# Patient Record
Sex: MALE | Race: WHITE | NOT HISPANIC OR LATINO | Employment: FULL TIME | URBAN - METROPOLITAN AREA
[De-identification: names, ages, dates, MRNs, and addresses within clinical notes are randomized per-mention and may not be internally consistent; named-entity substitution may affect disease eponyms.]

---

## 2018-05-14 ENCOUNTER — OFFICE VISIT (OUTPATIENT)
Dept: URGENT CARE | Facility: CLINIC | Age: 23
End: 2018-05-14
Payer: COMMERCIAL

## 2018-05-14 VITALS
HEART RATE: 97 BPM | DIASTOLIC BLOOD PRESSURE: 71 MMHG | RESPIRATION RATE: 16 BRPM | SYSTOLIC BLOOD PRESSURE: 133 MMHG | HEIGHT: 72 IN | TEMPERATURE: 97.8 F | WEIGHT: 250 LBS | OXYGEN SATURATION: 100 % | BODY MASS INDEX: 33.86 KG/M2

## 2018-05-14 DIAGNOSIS — H60.501 ACUTE OTITIS EXTERNA OF RIGHT EAR, UNSPECIFIED TYPE: Primary | ICD-10-CM

## 2018-05-14 PROCEDURE — 99203 OFFICE O/P NEW LOW 30 MIN: CPT | Performed by: PHYSICIAN ASSISTANT

## 2018-05-14 RX ORDER — LAMOTRIGINE 150 MG/1
100 TABLET ORAL DAILY
COMMUNITY

## 2018-05-14 RX ORDER — FLUVOXAMINE MALEATE 150 MG/1
CAPSULE, EXTENDED RELEASE ORAL
COMMUNITY

## 2018-05-14 NOTE — PROGRESS NOTES
[facility] Now        NAME: Shi Fink is a 25 y o  male  : 1995    MRN: [id number]  DATE: May 14, 2018  TIME: 6:52 PM    Assessment and Plan   Acute otitis externa of right ear, unspecified type [H60 501]  1  Acute otitis externa of right ear, unspecified type  neomycin-polymyxin-hydrocortisone (CORTISPORIN) otic solution     Patient Instructions     Take antibiotic drops as prescribed  Patient counseled extensively to avoid OTC drops and mechanical cleaning of ear until infection clears  May use debrox drops for ear wax removal and return for ear flushing 10 days from now  Follow up with PCP in 3-5 days  Proceed to  ER if symptoms worsen  Chief Complaint     Chief Complaint   Patient presents with    Cerumen Impaction     stated his right ear is blocked  tried using a q-tip and pushed wax in further     History of Present Illness     21yo M p/w blocked painful right ear x 10 days  Patient has been obsessively cleansing ear with qtip and patient believes he accidentally impacted his cerumen  Patient requesting ear be flushed at this time  Patient denies fever, chills, n/v/d, sob/wheezing, sore throat  Review of Systems   Review of Systems   Constitutional: Negative for activity change, appetite change, chills, diaphoresis, fatigue, fever and unexpected weight change  HENT: Positive for ear discharge and ear pain  Negative for rhinorrhea  Respiratory: Negative for apnea, cough, choking, chest tightness, shortness of breath, wheezing and stridor            Current Medications       Current Outpatient Prescriptions:     ARIPiprazole (ABILIFY PO), Take 7 5 mg by mouth daily, Disp: , Rfl:     Fluvoxamine Maleate (LUVOX CR) 150 MG CP24, Take by mouth, Disp: , Rfl:     lamoTRIgine (LaMICtal) 150 MG tablet, Take 100 mg by mouth daily, Disp: , Rfl:     neomycin-polymyxin-hydrocortisone (CORTISPORIN) otic solution, Administer 4 drops to the right ear every 6 (six) hours for 10 days, Disp: 10 mL, Rfl: 0    Current Allergies     Allergies as of 05/14/2018 - Reviewed 05/14/2018   Allergen Reaction Noted    Ativan [lorazepam] Delerium 05/14/2018            The following portions of the patient's history were reviewed and updated as appropriate: allergies, current medications, past family history, past medical history, past social history, past surgical history and problem list      Past Medical History:   Diagnosis Date    ADHD     Bipolar 1 disorder ([address] )     OCD (obsessive compulsive disorder)        History reviewed  No pertinent surgical history  No family history on file  Medications have been verified  Objective   /71   Pulse 97   Temp 97 8 °F (36 6 °C) (Tympanic)   Resp 16   Ht 6' (1 829 m)   Wt 113 kg (250 lb)   SpO2 100%   BMI 33 91 kg/m²        Physical Exam     Physical Exam   Constitutional: He appears well-developed and well-nourished  HENT:   Head: Normocephalic and atraumatic  Right Ear: There is drainage (purulent) and swelling (and erythema of canal)  Tympanic membrane is not erythematous and not bulging  Left Ear: Hearing, tympanic membrane, external ear and ear canal normal    Nose: Nose normal    Mouth/Throat: Oropharynx is clear and moist  No oropharyngeal exudate  Cardiovascular: Normal rate, regular rhythm, normal heart sounds and intact distal pulses  Exam reveals no gallop and no friction rub  No murmur heard  Pulmonary/Chest: Effort normal and breath sounds normal  No respiratory distress  He has no wheezes  Abdominal: Soft  Bowel sounds are normal  He exhibits no distension  There is no tenderness  Lymphadenopathy:     He has cervical adenopathy  Right cervical: Superficial cervical adenopathy present  Left cervical: No superficial cervical adenopathy present

## 2020-12-01 ENCOUNTER — OFFICE VISIT (OUTPATIENT)
Dept: URGENT CARE | Facility: CLINIC | Age: 25
End: 2020-12-01
Payer: COMMERCIAL

## 2020-12-01 VITALS
HEART RATE: 87 BPM | RESPIRATION RATE: 16 BRPM | BODY MASS INDEX: 37.25 KG/M2 | OXYGEN SATURATION: 98 % | TEMPERATURE: 98.4 F | HEIGHT: 72 IN | WEIGHT: 275 LBS

## 2020-12-01 DIAGNOSIS — J06.9 VIRAL URI: Primary | ICD-10-CM

## 2020-12-01 PROCEDURE — G0382 LEV 3 HOSP TYPE B ED VISIT: HCPCS | Performed by: PHYSICIAN ASSISTANT

## 2020-12-01 PROCEDURE — U0003 INFECTIOUS AGENT DETECTION BY NUCLEIC ACID (DNA OR RNA); SEVERE ACUTE RESPIRATORY SYNDROME CORONAVIRUS 2 (SARS-COV-2) (CORONAVIRUS DISEASE [COVID-19]), AMPLIFIED PROBE TECHNIQUE, MAKING USE OF HIGH THROUGHPUT TECHNOLOGIES AS DESCRIBED BY CMS-2020-01-R: HCPCS | Performed by: PHYSICIAN ASSISTANT

## 2020-12-01 RX ORDER — LEVOTHYROXINE SODIUM 0.03 MG/1
25 TABLET ORAL DAILY
COMMUNITY
Start: 2020-11-01

## 2020-12-02 LAB — SARS-COV-2 RNA SPEC QL NAA+PROBE: NOT DETECTED

## 2020-12-03 ENCOUNTER — TELEPHONE (OUTPATIENT)
Dept: URGENT CARE | Facility: CLINIC | Age: 25
End: 2020-12-03

## 2020-12-20 ENCOUNTER — OFFICE VISIT (OUTPATIENT)
Dept: URGENT CARE | Facility: CLINIC | Age: 25
End: 2020-12-20
Payer: COMMERCIAL

## 2020-12-20 VITALS
WEIGHT: 275 LBS | RESPIRATION RATE: 16 BRPM | TEMPERATURE: 97.5 F | BODY MASS INDEX: 37.25 KG/M2 | HEIGHT: 72 IN | HEART RATE: 104 BPM | OXYGEN SATURATION: 98 %

## 2020-12-20 DIAGNOSIS — J06.9 VIRAL URI WITH COUGH: Primary | ICD-10-CM

## 2020-12-20 PROCEDURE — G0382 LEV 3 HOSP TYPE B ED VISIT: HCPCS | Performed by: PHYSICIAN ASSISTANT

## 2020-12-20 PROCEDURE — 99283 EMERGENCY DEPT VISIT LOW MDM: CPT | Performed by: PHYSICIAN ASSISTANT

## 2020-12-20 PROCEDURE — U0003 INFECTIOUS AGENT DETECTION BY NUCLEIC ACID (DNA OR RNA); SEVERE ACUTE RESPIRATORY SYNDROME CORONAVIRUS 2 (SARS-COV-2) (CORONAVIRUS DISEASE [COVID-19]), AMPLIFIED PROBE TECHNIQUE, MAKING USE OF HIGH THROUGHPUT TECHNOLOGIES AS DESCRIBED BY CMS-2020-01-R: HCPCS | Performed by: PHYSICIAN ASSISTANT

## 2020-12-22 LAB — SARS-COV-2 RNA SPEC QL NAA+PROBE: NOT DETECTED

## 2022-11-22 ENCOUNTER — HOSPITAL ENCOUNTER (EMERGENCY)
Facility: HOSPITAL | Age: 27
Discharge: HOME | End: 2022-11-22
Attending: EMERGENCY MEDICINE

## 2022-11-22 VITALS
DIASTOLIC BLOOD PRESSURE: 79 MMHG | WEIGHT: 254.19 LBS | RESPIRATION RATE: 18 BRPM | TEMPERATURE: 96.8 F | SYSTOLIC BLOOD PRESSURE: 124 MMHG | OXYGEN SATURATION: 98 % | HEART RATE: 64 BPM | BODY MASS INDEX: 34.47 KG/M2

## 2022-11-22 DIAGNOSIS — R55 SYNCOPE: ICD-10-CM

## 2022-11-22 DIAGNOSIS — S61.019A: Primary | ICD-10-CM

## 2022-11-22 LAB
ATRIAL RATE: 65 BPM
GLUCOSE SERPL-MCNC: 161 MG/DL (ref 65–140)
P AXIS: 5 DEGREES
PR INTERVAL: 132 MS
QRS AXIS: 3 DEGREES
QRSD INTERVAL: 108 MS
QT INTERVAL: 392 MS
QTC INTERVAL: 407 MS
T WAVE AXIS: 12 DEGREES
VENTRICULAR RATE: 65 BPM

## 2022-11-22 RX ORDER — ACETAMINOPHEN 325 MG/1
650 TABLET ORAL ONCE
Status: COMPLETED | OUTPATIENT
Start: 2022-11-22 | End: 2022-11-22

## 2022-11-22 RX ORDER — BACITRACIN, NEOMYCIN, POLYMYXIN B 400; 3.5; 5 [USP'U]/G; MG/G; [USP'U]/G
1 OINTMENT TOPICAL ONCE
Status: DISCONTINUED | OUTPATIENT
Start: 2022-11-22 | End: 2022-11-22 | Stop reason: HOSPADM

## 2022-11-22 RX ORDER — ONDANSETRON 4 MG/1
4 TABLET, ORALLY DISINTEGRATING ORAL ONCE
Status: DISCONTINUED | OUTPATIENT
Start: 2022-11-22 | End: 2022-11-22 | Stop reason: HOSPADM

## 2022-11-22 RX ORDER — LIDOCAINE HYDROCHLORIDE 10 MG/ML
5 INJECTION, SOLUTION EPIDURAL; INFILTRATION; INTRACAUDAL; PERINEURAL ONCE
Status: COMPLETED | OUTPATIENT
Start: 2022-11-22 | End: 2022-11-22

## 2022-11-22 RX ADMIN — ACETAMINOPHEN 650 MG: 325 TABLET ORAL at 12:23

## 2022-11-22 RX ADMIN — LIDOCAINE HYDROCHLORIDE 5 ML: 10 INJECTION, SOLUTION EPIDURAL; INFILTRATION; INTRACAUDAL; PERINEURAL at 12:47

## 2022-11-22 NOTE — Clinical Note
Jillian Santana was seen and treated in our emergency department on 11/22/2022  Diagnosis:     Chris Christian  may return to work on return date  He may return on this date: 11/23/2022         If you have any questions or concerns, please don't hesitate to call        Matt Barrios MD    ______________________________           _______________          _______________  Hospital Representative                              Date                                Time

## 2022-11-22 NOTE — DISCHARGE INSTRUCTIONS
Thank you for letting us take care of you  You have been evaluated for a thumb laceration and passing out afterwards  Please continue drinking plenty of fluids  Please return in 10-14 days to have sutures removed  At this time, you have no clinical evidence of symptoms or problems that will require hospitalization, however you should be evaluated soon by a primary care physician, and contact information has been provided  Follow up with your primary care physician  This is important as many medical conditions can be managed as an outpatient, in addition to routine health screening  Seeing your primary doctor often can help identify changes in the medical issue that brought you to the ED for care today  If you experience any new symptoms or acute worsening of current symptoms, please return to the ED

## 2022-11-22 NOTE — ED PROVIDER NOTES
History  Chief Complaint   Patient presents with   • Syncope     Was cleaning glass light fixture at work when it broke, laceration to right thumb, sat down in a chair and looked at laceration, passed out and fell out of chair onto face, no thinners     24-year-old male with past medical history pertinent for last tetanus immunization 2 years ago who presents to the emergency department by EMS for evaluation of a thumb laceration that occurred while at work prior to arrival and a syncopal episode occurred afterwards  Patient reportedly hit his head on the ground after cutting himself accidentally  Patient reports 1/10 pain at this time  States he was cleaning a glass light fixture at work when it broke and resulted in a laceration to his right thumb  States he has had down a chair and then look at the laceration then passed out  Reports that he fell onto his face  Denies any blood thinner use  States he has normal range of motion of his thumb  Reports mild numbness at the tip  States bleeding was controlled with pressure  Denies any foreign bodies  States he is right-handed  Reports abrasion over the right periorbital area and a small hematoma over the right forehead  Denies any neck pain or any other injuries  States he did eat today  Denies any diabetes history  No other concerns  Prior to Admission Medications   Prescriptions Last Dose Informant Patient Reported? Taking? Fluvoxamine Maleate 150 MG CP24   Yes Yes   Sig: Take by mouth   lamoTRIgine (LaMICtal) 150 MG tablet   Yes Yes   Sig: Take 100 mg by mouth daily   lisdexamfetamine (VYVANSE) 40 MG capsule   Yes Yes   Sig: Take by mouth      Facility-Administered Medications: None       Past Medical History:   Diagnosis Date   • ADHD    • Bipolar 1 disorder ([facility] [address] )    • OCD (obsessive compulsive disorder)        Past Surgical History:   Procedure Laterality Date   • TOOTH EXTRACTION         History reviewed   No pertinent family history  I have reviewed and agree with the history as documented  E-Cigarette/Vaping   • E-Cigarette Use Never User      E-Cigarette/Vaping Substances     Social History     Tobacco Use   • Smoking status: Never   • Smokeless tobacco: Never   Vaping Use   • Vaping Use: Never used   Substance Use Topics   • Alcohol use: Yes     Comment: rare   • Drug use: Never       Review of Systems   Constitutional: Negative for activity change, appetite change, chills, diaphoresis and fever  HENT: Negative for congestion, rhinorrhea and sore throat  Eyes: Negative for visual disturbance  Respiratory: Negative for chest tightness and shortness of breath  Cardiovascular: Negative for chest pain, palpitations and leg swelling  Gastrointestinal: Negative for abdominal pain, constipation, diarrhea, nausea and vomiting  Genitourinary: Negative for difficulty urinating and hematuria  Musculoskeletal: Negative for back pain and neck pain  Skin: Positive for wound  Negative for color change and rash  Neurological: Positive for syncope  Negative for dizziness, weakness and headaches  Psychiatric/Behavioral: Negative for behavioral problems  Physical Exam  Physical Exam  Vitals and nursing note reviewed  Constitutional:       General: He is not in acute distress  Appearance: He is well-developed and well-nourished  He is not diaphoretic  HENT:      Head: Normocephalic  Comments: 1 cm diameter abrasion over the right periorbital area just to the right of the right eye ; 3 cm diameter hematoma over the right forehead with no active bleeding  Minimal tenderness to palpation  No bony deformity; no tenderness over the face; mandibles stable  No dental pain on palpation       Right Ear: External ear normal       Left Ear: External ear normal       Nose: Nose normal       Mouth/Throat:      Mouth: Oropharynx is clear and moist    Eyes:      Extraocular Movements: EOM normal       Pupils: Pupils are equal, round, and reactive to light  Neck:      Comments: C-collar in place on arrival  Cardiovascular:      Rate and Rhythm: Normal rate and regular rhythm  Pulses: Normal pulses and intact distal pulses  Heart sounds: Normal heart sounds  Pulmonary:      Effort: Pulmonary effort is normal  No respiratory distress  Breath sounds: Normal breath sounds  No wheezing or rales  Abdominal:      General: Bowel sounds are normal       Palpations: Abdomen is soft  There is no mass  Tenderness: There is no abdominal tenderness  Musculoskeletal:         General: No tenderness, deformity or edema  Normal range of motion  Cervical back: Normal range of motion and neck supple  Comments: Normal flexion and extension of the right thumb with normal sensation throughout  Skin:     General: Skin is warm and dry  Capillary Refill: Capillary refill takes less than 2 seconds  Findings: No erythema or rash  Comments: Approximately 2 cm C-shaped laceration to the distal thumb on the right side  Minimal active bleeding  No tenderness to palpation  No foreign bodies noted  Neurological:      General: No focal deficit present  Mental Status: He is alert and oriented to person, place, and time  Mental status is at baseline  Motor: No abnormal muscle tone  Comments: Patient is awake and alert and oriented x 3  No apparent deficits of memory or concentration  Speech is fluent  Fund of knowledge is appropriate  CN II - No field cuts by confrontation  CN III, IV, VI - pupils are 3 mm and briskly reactive  EOMs are full with no nystagmus  CN V - facial sensation is intact  CN VII - no facial asymmetry  CN VIII - auditory acuity is grossly intact to finger rub bilaterally  CN IX - palate rises symmetrically  CN XI - SCM and trapezius muscles are intact  CN XII - tongue protrudes midline    Sensory exam in intact to light touch, pinprick in all dermatomes tested    Coordination is grossly intact for finger-to-nose  5/5 strength in all extremities   Psychiatric:         Behavior: Behavior normal          Vital Signs  ED Triage Vitals [11/22/22 1220]   Temperature Pulse Respirations Blood Pressure SpO2   (!) 96 8 °F (36 °C) 81 18 133/83 100 %      Temp src Heart Rate Source Patient Position - Orthostatic VS BP Location FiO2 (%)   -- Monitor Lying Left arm --      Pain Score       No Pain           Vitals:    11/22/22 1220 11/22/22 1230 11/22/22 1245   BP: 133/83 129/82 129/81   Pulse: 81 73 80   Patient Position - Orthostatic VS: Lying           Visual Acuity  Visual Acuity    Flowsheet Row Most Recent Value   L Pupil Size (mm) 3   R Pupil Size (mm) 3          ED Medications  Medications   neomycin-bacitracin-polymyxin b (NEOSPORIN) ointment 1 small application (has no administration in time range)   ondansetron (ZOFRAN-ODT) dispersible tablet 4 mg (has no administration in time range)   acetaminophen (TYLENOL) tablet 650 mg (650 mg Oral Given 11/22/22 1223)   lidocaine (PF) (XYLOCAINE-MPF) 1 % injection 5 mL (5 mL Intradermal Given by Other 11/22/22 1247)       Diagnostic Studies  Results Reviewed     Procedure Component Value Units Date/Time    Fingerstick Glucose (POCT) [[id number]]  (Abnormal) Collected: 11/22/22 1223    Lab Status: Final result Updated: 11/22/22 1224     POC Glucose 161 mg/dl                  No orders to display              Procedures  ECG 12 Lead Documentation Only    Date/Time: 11/22/2022 12:20 PM  Performed by: Carmen Byers MD  Authorized by: Carmen Byers MD     ECG reviewed by me, the ED Provider: yes    Patient location:  ED  Previous ECG:     Previous ECG:  Unavailable  Interpretation:     Interpretation: normal    Rate:     ECG rate:  65    ECG rate assessment: normal    Rhythm:     Rhythm: sinus rhythm    Ectopy:     Ectopy: none    QRS:     QRS axis:  Normal  Conduction:     Conduction: normal    ST segments:     ST segments: Normal  T waves:     T waves: normal    Laceration repair    Date/Time: 11/22/2022 12:21 PM  Performed by: Matt Barrios MD  Authorized by: Matt Barrios MD   Consent: Verbal consent obtained  Risks and benefits: risks, benefits and alternatives were discussed  Consent given by: patient  Patient understanding: patient states understanding of the procedure being performed  Patient identity confirmed: verbally with patient  Time out: Immediately prior to procedure a "time out" was called to verify the correct patient, procedure, equipment, support staff and site/side marked as required  Body area: upper extremity  Location details: right thumb  Laceration length: 3 cm  Foreign bodies: no foreign bodies  Tendon involvement: none  Nerve involvement: none  Vascular damage: no  Anesthesia: local infiltration and digital block    Anesthesia:  Local Anesthetic: lidocaine 1% without epinephrine  Anesthetic total: 5 mL    Sedation:  Patient sedated: no      Wound Dehiscence:  Superficial Wound Dehiscence: simple closure      Procedure Details:  Preparation: Patient was prepped and draped in the usual sterile fashion  Irrigation solution: saline  Skin closure: 4-0 Prolene  Number of sutures: 3  Technique: simple  Approximation: close  Approximation difficulty: simple  Dressing: band aid    Patient tolerance: patient tolerated the procedure well with no immediate complications               ED Course          RESULTS:  Results Reviewed     Procedure Component Value Units Date/Time    Fingerstick Glucose (POCT) [[id number]]  (Abnormal) Collected: 11/22/22 1223    Lab Status: Final result Updated: 11/22/22 1224     POC Glucose 161 mg/dl           No orders to display       Vitals:    11/22/22 1220 11/22/22 1230 11/22/22 1245   BP: 133/83 129/82 129/81   Pulse: 81 73 80   Resp: 18     Patient Position - Orthostatic VS: Lying     Temp: (!) 96 8 °F (36 °C)         MDM  Number of Diagnoses or Management Options  Syncope  Thumb laceration, unspecified laterality, initial encounter  Diagnosis management comments: 51-year-old male with past medical history pertinent for last tetanus immunization 2 years ago who presents to the emergency department by EMS for evaluation of a thumb laceration that occurred while at work prior to arrival and a syncopal episode occurred afterwards  Vital signs on arrival here non concerning  Patient has exam as above  Laceration was repaired as noted in the procedure section above  EKG obtained given patient's syncopal episode in showed no acute concerns for any arrhythmia or any signs of ischemia  Blood glucose level was within normal limits on arrival   Patient likely had a vasovagal episode after seeing blood  Tetanus immunization over the up-to-date  Patient advised to return in 10-14 days to have sutures removed  Given Tylenol and bacitracin here for additional supportive care  C-collar cleared  Understands outpatient management plan and return precautions         Amount and/or Complexity of Data Reviewed  Clinical lab tests: ordered and reviewed  Tests in the medicine section of CPT®: ordered and reviewed  Review and summarize past medical records: yes    Risk of Complications, Morbidity, and/or Mortality  Presenting problems: moderate  Diagnostic procedures: moderate  Management options: moderate        Disposition  Final diagnoses:   Thumb laceration, unspecified laterality, initial encounter   Syncope     Time reflects when diagnosis was documented in both MDM as applicable and the Disposition within this note     Time User Action Codes Description Comment    11/22/2022 12:17 PM Bert Carlisle N Add [S61 019A] Thumb laceration, unspecified laterality, initial encounter     11/22/2022 12:17 PM Reilly Martinez Add [R55] Syncope       ED Disposition     ED Disposition   Discharge    Condition   Stable    Date/Time   Tue Nov 22, 2022  1:00 PM    [facility] discharge to home/self care                Follow-up Information     Follow up With Specialties Details Why Contact Info Additional Information    [facility] Emergency Department Emergency Medicine In 10 days For suture removal Daniella Mckinley 4 [phone]  [address] [facility] Emergency Department, [address]  ChandriakBarbara Nuoralia [address] [place] [address], [phone]          Patient's Medications   Discharge Prescriptions    No medications on file       No discharge procedures on file      PDMP Review     None          ED Provider  Electronically Signed by           Day Chris MD  11/22/22 [id number]

## 2022-12-20 ENCOUNTER — HOSPITAL ENCOUNTER (EMERGENCY)
Facility: HOSPITAL | Age: 27
Discharge: HOME | End: 2022-12-20
Attending: EMERGENCY MEDICINE

## 2022-12-20 VITALS
RESPIRATION RATE: 18 BRPM | DIASTOLIC BLOOD PRESSURE: 59 MMHG | TEMPERATURE: 97.9 F | BODY MASS INDEX: 34.01 KG/M2 | OXYGEN SATURATION: 98 % | HEIGHT: 72 IN | WEIGHT: 251.1 LBS | HEART RATE: 83 BPM | SYSTOLIC BLOOD PRESSURE: 124 MMHG

## 2022-12-20 DIAGNOSIS — T78.40XA ALLERGIC REACTION, INITIAL ENCOUNTER: Primary | ICD-10-CM

## 2022-12-20 DIAGNOSIS — T78.3XXA ANGIOEDEMA, INITIAL ENCOUNTER: ICD-10-CM

## 2022-12-20 DIAGNOSIS — L50.9 URTICARIAL RASH: ICD-10-CM

## 2022-12-20 RX ORDER — EPINEPHRINE 0.3 MG/.3ML
0.3 INJECTION SUBCUTANEOUS ONCE
Qty: 0.6 ML | Refills: 0 | Status: SHIPPED | OUTPATIENT
Start: 2022-12-20 | End: 2022-12-20

## 2022-12-20 RX ORDER — DIPHENHYDRAMINE HYDROCHLORIDE 50 MG/ML
25 INJECTION INTRAMUSCULAR; INTRAVENOUS ONCE
Status: COMPLETED | OUTPATIENT
Start: 2022-12-20 | End: 2022-12-20

## 2022-12-20 RX ORDER — FAMOTIDINE 20 MG/1
20 TABLET, FILM COATED ORAL 2 TIMES DAILY
Qty: 10 TABLET | Refills: 0 | Status: SHIPPED | OUTPATIENT
Start: 2022-12-20 | End: 2022-12-25

## 2022-12-20 RX ORDER — FAMOTIDINE 10 MG/ML
20 INJECTION, SOLUTION INTRAVENOUS ONCE
Status: COMPLETED | OUTPATIENT
Start: 2022-12-20 | End: 2022-12-20

## 2022-12-20 RX ORDER — METHYLPREDNISOLONE SODIUM SUCCINATE 125 MG/2ML
125 INJECTION, POWDER, LYOPHILIZED, FOR SOLUTION INTRAMUSCULAR; INTRAVENOUS ONCE
Status: COMPLETED | OUTPATIENT
Start: 2022-12-20 | End: 2022-12-20

## 2022-12-20 RX ORDER — PREDNISONE 20 MG/1
40 TABLET ORAL DAILY
Qty: 8 TABLET | Refills: 0 | Status: SHIPPED | OUTPATIENT
Start: 2022-12-20 | End: 2022-12-24

## 2022-12-20 RX ADMIN — SODIUM CHLORIDE 1000 ML: 0.9 INJECTION, SOLUTION INTRAVENOUS at 04:35

## 2022-12-20 RX ADMIN — DIPHENHYDRAMINE HYDROCHLORIDE 25 MG: 50 INJECTION, SOLUTION INTRAMUSCULAR; INTRAVENOUS at 04:30

## 2022-12-20 RX ADMIN — FAMOTIDINE 20 MG: 10 INJECTION, SOLUTION INTRAVENOUS at 04:30

## 2022-12-20 RX ADMIN — METHYLPREDNISOLONE SODIUM SUCCINATE 125 MG: 125 INJECTION, POWDER, FOR SOLUTION INTRAMUSCULAR; INTRAVENOUS at 04:30

## 2022-12-20 NOTE — Clinical Note
Rebecca Carmichael was seen and treated in our emergency department on 12/20/2022  Diagnosis:     Nathalie Whitehead  may return to work on return date  He may return on this date: If you have any questions or concerns, please don't hesitate to call        Manda Gunderson DO    ______________________________           _______________          _______________  Hospital Representative                              Date                                Time

## 2022-12-20 NOTE — ED PROVIDER NOTES
History  Chief Complaint   Patient presents with   • Facial Swelling     Pt noticed lip[ swelling around 2300, woke up around 0230 with full facial swelling, also reports itching" throat, hives on back      Patient is a 80-year-old male presenting to the emergency department complaining of swelling to his left lip and left thigh, he initially noted some swelling to his lip around 11 PM, then he woke up in the middle of the night around 230 with swelling to the entire left side of his face, he has had an erythematous urticarial rash for several weeks to months, he denies any new medications, new new foods, soaps, detergents, his significant other did start using a new shampoo in July patient himself is not using it, he did take 2 Benadryl prior to coming to the emergency department, he does report the sensation of swelling in the back of his throat with some difficulty swallowing but does not have any shortness of breath or difficulty breathing          Prior to Admission Medications   Prescriptions Last Dose Informant Patient Reported? Taking? Fluvoxamine Maleate 150 MG CP24   Yes No   Sig: Take by mouth   lamoTRIgine (LaMICtal) 150 MG tablet   Yes No   Sig: Take 100 mg by mouth daily   lisdexamfetamine (VYVANSE) 40 MG capsule   Yes No   Sig: Take by mouth      Facility-Administered Medications: None       Past Medical History:   Diagnosis Date   • ADHD    • Bipolar 1 disorder ([facility], [address] )    • OCD (obsessive compulsive disorder)        Past Surgical History:   Procedure Laterality Date   • TOOTH EXTRACTION         History reviewed  No pertinent family history  I have reviewed and agree with the history as documented      E-Cigarette/Vaping   • E-Cigarette Use Never User      E-Cigarette/Vaping Substances     Social History     Tobacco Use   • Smoking status: Never   • Smokeless tobacco: Never   Vaping Use   • Vaping Use: Never used   Substance Use Topics   • Alcohol use: Yes     Comment: rare   • Drug use: Never Review of Systems   Constitutional: Negative  HENT: Positive for facial swelling and trouble swallowing  Eyes: Negative  Respiratory: Negative  Cardiovascular: Negative  Gastrointestinal: Negative  Endocrine: Negative  Genitourinary: Negative  Musculoskeletal: Negative  Skin: Negative  Allergic/Immunologic: Negative  Neurological: Negative  Hematological: Negative  Psychiatric/Behavioral: Negative  Physical Exam  Physical Exam  Constitutional:       Appearance: He is well-developed  HENT:      Head: Normocephalic and atraumatic  Comments: Edema and swelling to the left periorbital area and left perioral area as well as the left upper lip     Mouth/Throat:      Pharynx: Oropharynx is clear  Uvula midline  No pharyngeal swelling, oropharyngeal exudate, posterior oropharyngeal erythema or uvula swelling  Comments: Airway is patent  Eyes:      Conjunctiva/sclera: Conjunctivae normal       Pupils: Pupils are equal, round, and reactive to light  Cardiovascular:      Rate and Rhythm: Normal rate  Pulmonary:      Effort: Pulmonary effort is normal       Breath sounds: Normal breath sounds and air entry  Abdominal:      Palpations: Abdomen is soft  Musculoskeletal:         General: Normal range of motion  Cervical back: Normal range of motion and neck supple  Skin:     General: Skin is warm and dry  Neurological:      Mental Status: He is alert and oriented to person, place, and time           Vital Signs  ED Triage Vitals [12/20/22 0416]   Temperature Pulse Respirations Blood Pressure SpO2   97 9 °F (36 6 °C) 82 20 124/73 98 %      Temp Source Heart Rate Source Patient Position - Orthostatic VS BP Location FiO2 (%)   Temporal Monitor Sitting Left arm --      Pain Score       --           Vitals:    12/20/22 0416 12/20/22 0430 12/20/22 0500 12/20/22 0530   BP: 124/73 113/55 123/74 124/59   Pulse: 82 69 78 83   Patient Position - Orthostatic VS: Sitting  Sitting                ED Medications  Medications   sodium chloride 0 9 % bolus 1,000 mL (0 mL Intravenous Stopped 12/20/22 0536)   diphenhydrAMINE (BENADRYL) injection 25 mg (25 mg Intravenous Given 12/20/22 0430)   Famotidine (PF) (PEPCID) injection 20 mg (20 mg Intravenous Given 12/20/22 0430)   methylPREDNISolone sodium succinate (Solu-MEDROL) injection 125 mg (125 mg Intravenous Given 12/20/22 0430)       Diagnostic Studies  Results Reviewed     None                 No orders to display              Procedures  Procedures         ED Course                                             MDM  Number of Diagnoses or Management Options  Allergic reaction, initial encounter: new and does not require workup  Angioedema, initial encounter: new and does not require workup  Urticarial rash: new and does not require workup  Diagnosis management comments: Patient reports significant improvement of symptoms, still has mild swelling of his left upper lip, however airways patent, lungs are clear to auscultation and swelling is significantly reduced, therefore discharged home with medications for symptom control, as well as referral for follow-up with allergist, advised to have a discussion with physician who prescribes his medications as to out of 3 of his medications have angioedema listed in the side effect profile, patient otherwise advised to return if symptoms worsen or any additional concerns, patient acknowledges understanding and agreement with this plan    Risk of Complications, Morbidity, and/or Mortality  Presenting problems: moderate  Diagnostic procedures: moderate  Management options: moderate    Patient Progress  Patient progress: improved      Disposition  Final diagnoses:    Allergic reaction, initial encounter   Urticarial rash   Angioedema, initial encounter     Time reflects when diagnosis was documented in both MDM as applicable and the Disposition within this note     Time User Action Codes Description Comment    12/20/2022  5:20 AM Chris, Gosia Friends Add [T78 40XA] Allergic reaction, initial encounter     12/20/2022  5:20 AM Chris, Gosia Friends Add [L50 9] Urticarial rash     12/20/2022  5:20 AM Polluis, Sindyll Friends Add [T78  3XXA] Angioedema, initial encounter       ED Disposition     ED Disposition   Discharge    Condition   Stable    Date/Time   Tue Dec 20, 2022  5:20 AM    Comment   Heather Gage discharge to home/self care                 Follow-up Information     Follow up With Specialties Details Why Markie Marv 79 DO [facility] In 3 days As needed [address] [facility]  [phone]            Discharge Medication List as of 12/20/2022  5:22 AM      START taking these medications    Details   EPINEPHrine (EPIPEN) 0 3 mg/0 3 mL SOAJ Inject 0 3 mL (0 3 mg total) into a muscle once for 1 dose, Starting Tue 12/20/2022, Normal      famotidine (PEPCID) 20 mg tablet Take 1 tablet (20 mg total) by mouth 2 (two) times a day for 5 days, Starting Tue 12/20/2022, Until Sun 12/25/2022, Normal      predniSONE 20 mg tablet Take 2 tablets (40 mg total) by mouth daily for 4 days, Starting Tue 12/20/2022, Until Sat 12/24/2022, Normal         CONTINUE these medications which have NOT CHANGED    Details   Fluvoxamine Maleate 150 MG CP24 Take by mouth, Historical Med      lamoTRIgine (LaMICtal) 150 MG tablet Take 100 mg by mouth daily, Historical Med      lisdexamfetamine (VYVANSE) 40 MG capsule Take by mouth, Historical Med                 PDMP Review     None          ED Provider  Electronically Signed by           Daniel Clemente DO  12/20/22 7666

## 2022-12-23 ENCOUNTER — OFFICE VISIT (OUTPATIENT)
Dept: URGENT CARE | Facility: CLINIC | Age: 27
End: 2022-12-23

## 2022-12-23 VITALS
DIASTOLIC BLOOD PRESSURE: 90 MMHG | TEMPERATURE: 100 F | SYSTOLIC BLOOD PRESSURE: 143 MMHG | HEIGHT: 72 IN | WEIGHT: 256 LBS | RESPIRATION RATE: 16 BRPM | BODY MASS INDEX: 34.67 KG/M2 | OXYGEN SATURATION: 97 % | HEART RATE: 93 BPM

## 2022-12-23 DIAGNOSIS — R68.89 FLU-LIKE SYMPTOMS: Primary | ICD-10-CM

## 2022-12-23 LAB
SARS-COV-2 AG UPPER RESP QL IA: NEGATIVE
VALID CONTROL: NORMAL

## 2022-12-23 RX ORDER — CETIRIZINE HYDROCHLORIDE 10 MG/1
10 TABLET ORAL 4 TIMES DAILY
COMMUNITY
Start: 2022-12-22

## 2022-12-23 NOTE — PROGRESS NOTES
[facility]        NAME: Dong Carl is a 32 y o  male  : 1995    MRN: [id number]  DATE: 2022  TIME: 5:46 PM    Assessment and Plan   Flu-like symptoms [R68 89]  1  Flu-like symptoms  Poct Covid 19 Rapid Antigen Test    Cov/Flu-Collected at [facility] or [facility]        Rapid point of care COVID testing negative  COVID/Flu PCR pending  Results will be viewable via [employer]  Follow [facility] guidelines for isolation/quarantine until results back and then as appropriate based on final results  Encouraged continued supportive measures, including increased fluid intake and rest   Follow up with PCP in 3-5 days or proceed to emergency department for worsening symptoms  Patient verbalized understanding of instructions given  Patient Instructions     Patient Instructions       Rapid point of care COVID testing negative  COVID/Flu PCR pending  Results will be viewable via [employer]  Follow [facility] guidelines for isolation/quarantine until results back and then as appropriate based on final results  Continue with supportive measures, OTC Tylenol/Ibuprofen, nasal decongestants, and cough suppressants   Cool mist humidifiers, throat lozenges, increased fluid intake and rest   Follow up with PCP in 3-5 days  Present to ER if symptoms worsen    Viral Syndrome   AMBULATORY CARE:   Viral syndrome  is a term used for symptoms of an infection caused by a virus  Viruses are spread easily from person to person through the air and on shared items  Signs and symptoms  may start slowly or suddenly and last hours to days  They can be mild to severe and can change over days or hours   You may have any of the following:  · Fever and chills    · A runny or stuffy nose    · Cough, sore throat, or hoarseness    · Headache, or pain and pressure around your eyes    · Muscle aches and joint pain    · Shortness of breath or wheezing    · Abdominal pain, cramps, and diarrhea    · Nausea, vomiting, or loss of appetite    Call your local emergency number [phone] in the [address]) or have someone else call if:   · You have a seizure  · You cannot be woken  · You have chest pain or trouble breathing  Seek care immediately if:   · You have a stiff neck, a bad headache, and sensitivity to light  · You feel weak, dizzy, or confused  · You stop urinating or urinate a lot less than usual     · You cough up blood or thick yellow or green mucus  · You have severe abdominal pain or your abdomen is larger than usual     Call your doctor if:   · Your symptoms do not get better with treatment or get worse after 3 days  · You have a rash or ear pain  · You have burning when you urinate  · You have questions or concerns about your condition or care  Treatment for viral syndrome  may include medicines to manage your symptoms  Antibiotics are not given for a viral infection  You may  need any of the following:  · Acetaminophen  decreases pain and fever  It is available without a doctor's order  Ask how much to take and how often to take it  Follow directions  Read the labels of all other medicines you are using to see if they also contain acetaminophen, or ask your doctor or pharmacist  Acetaminophen can cause liver damage if not taken correctly  Do not use more than 4 grams (4,000 milligrams) total of acetaminophen in one day  · NSAIDs , such as ibuprofen, help decrease swelling, pain, and fever  NSAIDs can cause stomach bleeding or kidney problems in certain people  If you take blood thinner medicine, always ask your healthcare provider if NSAIDs are safe for you  Always read the medicine label and follow directions  · Cold medicine  helps decrease swelling, control a cough, and relieve chest or nasal congestion  · Saline nasal spray  helps decrease nasal congestion  Manage your symptoms:   · Drink liquids as directed to prevent dehydration    Ask how much liquid to drink each day and which liquids are best for you  Ask if you should drink an oral rehydration solution (ORS)  An ORS has the right amounts of water, salts, and sugar you need to replace body fluids  This may help prevent dehydration caused by vomiting or diarrhea  Do not drink liquids with caffeine  Liquids with caffeine can make dehydration worse  · Get plenty of rest to help your body heal   Take naps throughout the day  Ask your healthcare provider when you can return to work and your normal activities  · Use a cool mist humidifier to help you breathe easier  Ask your healthcare provider how to use a cool mist humidifier  · Eat honey or use cough drops for a sore throat  Cough drops are available without a doctor's order  Follow directions for taking cough drops  · Do not smoke or be close to anyone who is smoking  Nicotine and other chemicals in cigarettes and cigars can cause lung damage  Smoking can also delay healing  Ask your healthcare provider for information if you currently smoke and need help to quit  E-cigarettes or smokeless tobacco still contain nicotine  Talk to your healthcare provider before you use these products  Prevent the spread of germs:       1  Wash your hands often  Wash your hands several times each day  Wash after you use the bathroom, change a child's diaper, and before you prepare or eat food  Use soap and water every time  Rub your soapy hands together, lacing your fingers  Wash the front and back of your hands, and in between your fingers  Use the fingers of one hand to scrub under the fingernails of the other hand  Wash for at least 20 seconds  Rinse with warm, running water for several seconds  Then dry your hands with a clean towel or paper towel  Use hand  that contains alcohol if soap and water are not available  Do not touch your eyes, nose, or mouth without washing your hands first          2  Cover a sneeze or cough  Use a tissue that covers your mouth and nose   Throw the tissue away in a trash can right away  Use the bend of your arm if a tissue is not available  Wash your hands well with soap and water or use a hand   3  Stay away from others while you are sick  Avoid crowds as much as possible  4  Ask about vaccines you may need  Talk to your healthcare provider about your vaccine history  He or she will tell you which vaccines you need, and when to get them  ? Get the influenza (flu) vaccine as soon as recommended each year  The flu vaccine is available starting in September or October  Flu viruses change, so it is important to get a flu vaccine every year  ? Get the pneumonia vaccine if recommended  This vaccine is usually recommended every 5 years  Your provider will tell you when to get this vaccine, if needed  Follow up with your doctor as directed:  Write down your questions so you remember to ask them during your visits  © Copyright [employer] 2022 Information is for End User's use only and may not be sold, redistributed or otherwise used for commercial purposes  All illustrations and images included in CareNotes® are the copyrighted property of [employer] D [employer]  or [address] [place], [place] [address]on   The above information is an  only  It is not intended as medical advice for individual conditions or treatments  Talk to your doctor, nurse or pharmacist before following any medical regimen to see if it is safe and effective for you  Chief Complaint     Chief Complaint   Patient presents with   • Cold Like Symptoms     C/o Dry cough, nasal congestion, ear pressure bilaterally, Onset 2 days ago, today developed chills and generalized aches  History of Present Illness       51-year-old male presents with complaints of nasal congestion, cough, ears “blocked” sensation, sore throat, fever, chills, and body aches x1 day  Patient denies any known sick contacts or exposures  He is taking OTC Tylenol for his symptoms  Denies smoking       Of note, recently seen in ED for angioedema and rash  Started on Prednisone today by Allergist due to continued rash  Review of Systems   Review of Systems   Constitutional: Positive for chills and fever  HENT: Positive for congestion, rhinorrhea and sore throat  Negative for ear discharge, ear pain, trouble swallowing and voice change  Eyes: Negative for discharge  Respiratory: Positive for cough  Negative for shortness of breath and wheezing  Cardiovascular: Negative for chest pain  Gastrointestinal: Negative for abdominal pain, diarrhea, nausea and vomiting  Musculoskeletal: Positive for myalgias  Skin: Positive for rash           Current Medications       Current Outpatient Medications:   •  diphenhydrAMINE (BENADRYL) 50 MG tablet, Take 50 mg by mouth every 6 (six) hours as needed for itching, Disp: , Rfl:   •  Fluvoxamine Maleate 150 MG CP24, Take by mouth, Disp: , Rfl:   •  lamoTRIgine (LaMICtal) 150 MG tablet, Take 100 mg by mouth daily, Disp: , Rfl:   •  lisdexamfetamine (VYVANSE) 40 MG capsule, Take by mouth, Disp: , Rfl:   •  cetirizine (ZyrTEC) 10 mg tablet, Take 10 mg by mouth 4 (four) times a day, Disp: , Rfl:   •  EPINEPHrine (EPIPEN) 0 3 mg/0 3 mL SOAJ, Inject 0 3 mL (0 3 mg total) into a muscle once for 1 dose, Disp: 0 6 mL, Rfl: 0  •  famotidine (PEPCID) 20 mg tablet, Take 1 tablet (20 mg total) by mouth 2 (two) times a day for 5 days (Patient not taking: Reported on 12/23/2022), Disp: 10 tablet, Rfl: 0  •  predniSONE 20 mg tablet, Take 2 tablets (40 mg total) by mouth daily for 4 days (Patient not taking: Reported on 12/23/2022), Disp: 8 tablet, Rfl: 0    Current Allergies     Allergies as of 12/23/2022 - Reviewed 12/23/2022   Allergen Reaction Noted   • Ativan [lorazepam] Delirium 05/14/2018            The following portions of the patient's history were reviewed and updated as appropriate: allergies, current medications, past family history, past medical history, past social history, past surgical history and problem list      Past Medical History:   Diagnosis Date   • ADHD    • Allergic    • Bipolar 1 disorder ([address] )    • OCD (obsessive compulsive disorder)        Past Surgical History:   Procedure Laterality Date   • TOOTH EXTRACTION         Family History   Problem Relation Age of Onset   • No Known Problems Mother    • No Known Problems Father          Medications have been verified  Objective   /90   Pulse 93   Temp 100 °F (37 8 °C)   Resp 16   Ht 6' (1 829 m)   Wt 116 kg (256 lb)   SpO2 97%   BMI 34 72 kg/m²   No LMP for male patient  Physical Exam     Physical Exam  Vitals and nursing note reviewed  Constitutional:       General: He is not in acute distress  Appearance: He is not toxic-appearing  HENT:      Head: Normocephalic  Right Ear: Tympanic membrane, ear canal and external ear normal       Left Ear: Tympanic membrane, ear canal and external ear normal       Nose: Congestion present  Mouth/Throat:      Mouth: Mucous membranes are moist       Pharynx: Oropharynx is clear  Posterior oropharyngeal erythema present  Comments: Mildly erythematous pharynx  Eyes:      Conjunctiva/sclera: Conjunctivae normal    Cardiovascular:      Rate and Rhythm: Normal rate and regular rhythm  Heart sounds: Normal heart sounds  Pulmonary:      Effort: Pulmonary effort is normal  No respiratory distress  Breath sounds: Normal breath sounds  No stridor  No wheezing, rhonchi or rales  Lymphadenopathy:      Cervical: No cervical adenopathy  Skin:     General: Skin is warm and dry  Findings: Rash present  Comments: Generalized urticaria    Neurological:      Mental Status: He is alert and oriented to person, place, and time  Gait: Gait is intact     Psychiatric:         Mood and Affect: Mood normal          Behavior: Behavior normal

## 2022-12-23 NOTE — PATIENT INSTRUCTIONS
Rapid point of care COVID testing negative  COVID/Flu PCR pending  Results will be viewable via viseto  Follow CDC guidelines for isolation/quarantine until results back and then as appropriate based on final results  Continue with supportive measures, OTC Tylenol/Ibuprofen, nasal decongestants, and cough suppressants   Cool mist humidifiers, throat lozenges, increased fluid intake and rest   Follow up with PCP in 3-5 days  Present to ER if symptoms worsen    Viral Syndrome   AMBULATORY CARE:   Viral syndrome  is a term used for symptoms of an infection caused by a virus  Viruses are spread easily from person to person through the air and on shared items  Signs and symptoms  may start slowly or suddenly and last hours to days  They can be mild to severe and can change over days or hours  You may have any of the following:  Fever and chills    A runny or stuffy nose    Cough, sore throat, or hoarseness    Headache, or pain and pressure around your eyes    Muscle aches and joint pain    Shortness of breath or wheezing    Abdominal pain, cramps, and diarrhea    Nausea, vomiting, or loss of appetite    Call your local emergency number (911 in the [address] [place] [address],[address]) or have someone else call if:   You have a seizure  You cannot be woken  You have chest pain or trouble breathing  Seek care immediately if:   You have a stiff neck, a bad headache, and sensitivity to light  You feel weak, dizzy, or confused  You stop urinating or urinate a lot less than usual     You cough up blood or thick yellow or green mucus  You have severe abdominal pain or your abdomen is larger than usual     Call your doctor if:   Your symptoms do not get better with treatment or get worse after 3 days  You have a rash or ear pain  You have burning when you urinate  You have questions or concerns about your condition or care  Treatment for viral syndrome  may include medicines to manage your symptoms   Antibiotics are not given for a viral infection  You may  need any of the following:  Acetaminophen  decreases pain and fever  It is available without a doctor's order  Ask how much to take and how often to take it  Follow directions  Read the labels of all other medicines you are using to see if they also contain acetaminophen, or ask your doctor or pharmacist  Acetaminophen can cause liver damage if not taken correctly  Do not use more than 4 grams (4,000 milligrams) total of acetaminophen in one day  NSAIDs , such as ibuprofen, help decrease swelling, pain, and fever  NSAIDs can cause stomach bleeding or kidney problems in certain people  If you take blood thinner medicine, always ask your healthcare provider if NSAIDs are safe for you  Always read the medicine label and follow directions  Cold medicine  helps decrease swelling, control a cough, and relieve chest or nasal congestion  Saline nasal spray  helps decrease nasal congestion  Manage your symptoms:   Drink liquids as directed to prevent dehydration  Ask how much liquid to drink each day and which liquids are best for you  Ask if you should drink an oral rehydration solution (ORS)  An ORS has the right amounts of water, salts, and sugar you need to replace body fluids  This may help prevent dehydration caused by vomiting or diarrhea  Do not drink liquids with caffeine  Liquids with caffeine can make dehydration worse  Get plenty of rest to help your body heal   Take naps throughout the day  Ask your healthcare provider when you can return to work and your normal activities  Use a cool mist humidifier to help you breathe easier  Ask your healthcare provider how to use a cool mist humidifier  Eat honey or use cough drops for a sore throat  Cough drops are available without a doctor's order  Follow directions for taking cough drops  Do not smoke or be close to anyone who is smoking    Nicotine and other chemicals in cigarettes and cigars can cause lung damage  Smoking can also delay healing  Ask your healthcare provider for information if you currently smoke and need help to quit  E-cigarettes or smokeless tobacco still contain nicotine  Talk to your healthcare provider before you use these products  Prevent the spread of germs:       Wash your hands often  Wash your hands several times each day  Wash after you use the bathroom, change a child's diaper, and before you prepare or eat food  Use soap and water every time  Rub your soapy hands together, lacing your fingers  Wash the front and back of your hands, and in between your fingers  Use the fingers of one hand to scrub under the fingernails of the other hand  Wash for at least 20 seconds  Rinse with warm, running water for several seconds  Then dry your hands with a clean towel or paper towel  Use hand  that contains alcohol if soap and water are not available  Do not touch your eyes, nose, or mouth without washing your hands first          Cover a sneeze or cough  Use a tissue that covers your mouth and nose  Throw the tissue away in a trash can right away  Use the bend of your arm if a tissue is not available  Wash your hands well with soap and water or use a hand   Stay away from others while you are sick  Avoid crowds as much as possible  Ask about vaccines you may need  Talk to your healthcare provider about your vaccine history  He or she will tell you which vaccines you need, and when to get them  Get the influenza (flu) vaccine as soon as recommended each year  The flu vaccine is available starting in September or October  Flu viruses change, so it is important to get a flu vaccine every year  Get the pneumonia vaccine if recommended  This vaccine is usually recommended every 5 years  Your provider will tell you when to get this vaccine, if needed      Follow up with your doctor as directed:  Write down your questions so you remember to ask them during your visits  © Copyright [employer] 2022 Information is for End User's use only and may not be sold, redistributed or otherwise used for commercial purposes  All illustrations and images included in CareNotes® are the copyrighted property of [employer]  or Ashvin Johansen  The above information is an  only  It is not intended as medical advice for individual conditions or treatments  Talk to your doctor, nurse or pharmacist before following any medical regimen to see if it is safe and effective for you

## 2022-12-24 LAB
FLUAV RNA RESP QL NAA+PROBE: POSITIVE
FLUBV RNA RESP QL NAA+PROBE: NEGATIVE
SARS-COV-2 RNA RESP QL NAA+PROBE: NEGATIVE

## 2023-04-07 ENCOUNTER — OFFICE VISIT (OUTPATIENT)
Dept: URGENT CARE | Facility: CLINIC | Age: 28
End: 2023-04-07

## 2023-04-07 VITALS
OXYGEN SATURATION: 98 % | RESPIRATION RATE: 16 BRPM | DIASTOLIC BLOOD PRESSURE: 64 MMHG | HEIGHT: 72 IN | TEMPERATURE: 98.4 F | SYSTOLIC BLOOD PRESSURE: 110 MMHG | BODY MASS INDEX: 34.67 KG/M2 | HEART RATE: 84 BPM | WEIGHT: 256 LBS

## 2023-04-07 DIAGNOSIS — T78.40XA ALLERGIC REACTION, INITIAL ENCOUNTER: Primary | ICD-10-CM

## 2023-04-07 RX ORDER — PREDNISONE 10 MG/1
TABLET ORAL
Qty: 21 TABLET | Refills: 0 | Status: SHIPPED | OUTPATIENT
Start: 2023-04-07 | End: 2023-04-07

## 2023-04-07 RX ORDER — PREDNISONE 10 MG/1
TABLET ORAL
Qty: 21 TABLET | Refills: 0 | Status: SHIPPED | OUTPATIENT
Start: 2023-04-07

## 2023-04-07 RX ORDER — METHYLPREDNISOLONE SODIUM SUCCINATE 125 MG/2ML
125 INJECTION, POWDER, LYOPHILIZED, FOR SOLUTION INTRAMUSCULAR; INTRAVENOUS ONCE
Status: COMPLETED | OUTPATIENT
Start: 2023-04-07 | End: 2023-04-07

## 2023-04-07 RX ORDER — DIPHENHYDRAMINE HCL 25 MG
50 TABLET ORAL ONCE
Status: COMPLETED | OUTPATIENT
Start: 2023-04-07 | End: 2023-04-07

## 2023-04-07 RX ADMIN — Medication 50 MG: at 11:27

## 2023-04-07 RX ADMIN — METHYLPREDNISOLONE SODIUM SUCCINATE 125 MG: 125 INJECTION, POWDER, LYOPHILIZED, FOR SOLUTION INTRAMUSCULAR; INTRAVENOUS at 11:27

## 2023-04-07 NOTE — PROGRESS NOTES
[facility]        NAME: Cici Christianson is a 32 y o  male  : 1995    MRN: [id number]  DATE: 2023  TIME: 12:01 PM    Assessment and Plan   Allergic reaction, initial encounter Olvin Sappes  1  Allergic reaction, initial encounter  methylPREDNISolone sodium succinate (Solu-MEDROL) injection 125 mg    diphenhydrAMINE (BENADRYL) tablet 50 mg    predniSONE 10 mg tablet    DISCONTINUED: diphenhydrAMINE (BENADRYL) oral liquid 25 mg    DISCONTINUED: predniSONE 10 mg tablet        Gave strict precautions on when to proceed to ER  Patient Instructions     Continue Zyrtec  Can take Benadryl as needed   Plenty of fluids  Follow up with PCP in 3-5 days  Proceed to  ER if symptoms worsen  Chief Complaint     Chief Complaint   Patient presents with   • Allergic Reaction         History of Present Illness       Patient presented for an allergic reaction consisting of facial swelling and stomach cramping that began last night  He in unsure what the cause is  He has an allergy to scented and dyed detergents, but has avoided using them  He stated that this has been a chronic condition but the past 1 week he has having a reaction off and on and usually wakes up with hives on his legs and arms  He takes zyrtec in the morning  This morning he took [employer] without relief  He denies any vision changes except for some loss of vision due to swelling  He denies any difficulty breathing, wheezing, or throat tightness  Review of Systems   Review of Systems   Constitutional: Negative  HENT: Negative  Eyes: Positive for visual disturbance (secondary to swelling around eyes)  Respiratory: Negative  Cardiovascular: Negative  Gastrointestinal: Negative  Skin: Negative for color change and pallor  Allergic/Immunologic: Positive for environmental allergies (facial swelling)           Current Medications       Current Outpatient Medications:   •  cetirizine (ZyrTEC) 10 mg tablet, Take 10 mg by mouth 4 (four) times a day, Disp: , Rfl:   •  Fluvoxamine Maleate 150 MG CP24, Take by mouth, Disp: , Rfl:   •  lamoTRIgine (LaMICtal) 150 MG tablet, Take 100 mg by mouth daily, Disp: , Rfl:   •  lisdexamfetamine (VYVANSE) 40 MG capsule, Take by mouth, Disp: , Rfl:   •  predniSONE 10 mg tablet, Take six pills on day 1, take five pills on day 2, take four pills on day 3, take three pills on day 4, take two pills on day 5, take one pill on day 6, Disp: 21 tablet, Rfl: 0  •  EPINEPHrine (EPIPEN) 0 3 mg/0 3 mL SOAJ, Inject 0 3 mL (0 3 mg total) into a muscle once for 1 dose, Disp: 0 6 mL, Rfl: 0  •  famotidine (PEPCID) 20 mg tablet, Take 1 tablet (20 mg total) by mouth 2 (two) times a day for 5 days (Patient not taking: Reported on 12/23/2022), Disp: 10 tablet, Rfl: 0  No current facility-administered medications for this visit  Current Allergies     Allergies as of 04/07/2023 - Reviewed 12/23/2022   Allergen Reaction Noted   • Ativan [lorazepam] Delirium 05/14/2018            The following portions of the patient's history were reviewed and updated as appropriate: allergies, current medications, past family history, past medical history, past social history, past surgical history and problem list      Past Medical History:   Diagnosis Date   • ADHD    • Allergic    • Bipolar 1 disorder ([facility] [address] )    • OCD (obsessive compulsive disorder)        Past Surgical History:   Procedure Laterality Date   • TOOTH EXTRACTION         Family History   Problem Relation Age of Onset   • No Known Problems Mother    • No Known Problems Father          Medications have been verified  Objective   /64   Pulse 84   Temp 98 4 °F (36 9 °C)   Resp 16   Ht 6' (1 829 m)   Wt 116 kg (256 lb)   SpO2 98%   BMI 34 72 kg/m²        Physical Exam     Physical Exam  Constitutional:       Comments: Facial swelling   HENT:      Head: Normocephalic        Mouth/Throat:      Mouth: Mucous membranes are moist       Pharynx: Oropharynx is clear    Eyes:      Extraocular Movements: Extraocular movements intact  Pupils: Pupils are equal, round, and reactive to light  Cardiovascular:      Rate and Rhythm: Normal rate and regular rhythm  Pulses: Normal pulses  Heart sounds: Normal heart sounds  Pulmonary:      Effort: Pulmonary effort is normal  No respiratory distress  Breath sounds: Normal breath sounds  Musculoskeletal:      Cervical back: Normal range of motion  Skin:     General: Skin is warm and dry  Capillary Refill: Capillary refill takes less than 2 seconds  Neurological:      General: No focal deficit present  Mental Status: He is alert and oriented to person, place, and time  Mental status is at baseline  Psychiatric:         Mood and Affect: Mood normal          Behavior: Behavior normal          Thought Content:  Thought content normal          Judgment: Judgment normal

## 2023-04-07 NOTE — PATIENT INSTRUCTIONS
Continue Zyrtec  Can take Benadryl as needed   Plenty of fluids  Follow up with PCP in 3-5 days  Proceed to  ER if symptoms worsen